# Patient Record
Sex: MALE | Employment: FULL TIME | URBAN - METROPOLITAN AREA
[De-identification: names, ages, dates, MRNs, and addresses within clinical notes are randomized per-mention and may not be internally consistent; named-entity substitution may affect disease eponyms.]

---

## 2017-10-02 ENCOUNTER — OFFICE VISIT (OUTPATIENT)
Dept: FAMILY MEDICINE CLINIC | Age: 47
End: 2017-10-02

## 2017-10-02 VITALS
WEIGHT: 186 LBS | DIASTOLIC BLOOD PRESSURE: 74 MMHG | BODY MASS INDEX: 29.19 KG/M2 | TEMPERATURE: 98.9 F | HEIGHT: 67 IN | SYSTOLIC BLOOD PRESSURE: 124 MMHG

## 2017-10-02 DIAGNOSIS — Z87.19 HISTORY OF DIVERTICULITIS OF COLON: ICD-10-CM

## 2017-10-02 DIAGNOSIS — J30.1 SEASONAL ALLERGIC RHINITIS DUE TO POLLEN: Primary | ICD-10-CM

## 2017-10-02 PROCEDURE — 99203 OFFICE O/P NEW LOW 30 MIN: CPT | Performed by: INTERNAL MEDICINE

## 2017-10-02 RX ORDER — VITAMIN B COMPLEX
1 CAPSULE ORAL DAILY
COMMUNITY
End: 2019-11-04

## 2017-10-02 RX ORDER — MONTELUKAST SODIUM 10 MG/1
10 TABLET ORAL DAILY
COMMUNITY
End: 2017-10-11 | Stop reason: SDUPTHER

## 2017-10-02 ASSESSMENT — PATIENT HEALTH QUESTIONNAIRE - PHQ9
2. FEELING DOWN, DEPRESSED OR HOPELESS: 0
1. LITTLE INTEREST OR PLEASURE IN DOING THINGS: 0
SUM OF ALL RESPONSES TO PHQ9 QUESTIONS 1 & 2: 0
SUM OF ALL RESPONSES TO PHQ QUESTIONS 1-9: 0

## 2017-10-02 ASSESSMENT — ENCOUNTER SYMPTOMS
CONSTIPATION: 0
ABDOMINAL PAIN: 0
NAUSEA: 0
WHEEZING: 0
SINUS PRESSURE: 0
VOMITING: 0
RHINORRHEA: 0
COUGH: 0
SHORTNESS OF BREATH: 0
APNEA: 0
DIARRHEA: 0
SORE THROAT: 0
BLOOD IN STOOL: 0

## 2017-10-11 ENCOUNTER — TELEPHONE (OUTPATIENT)
Dept: FAMILY MEDICINE CLINIC | Age: 47
End: 2017-10-11

## 2017-10-11 RX ORDER — MONTELUKAST SODIUM 10 MG/1
10 TABLET ORAL DAILY
Qty: 30 TABLET | Refills: 5 | Status: SHIPPED | OUTPATIENT
Start: 2017-10-11 | End: 2019-01-09

## 2017-10-11 RX ORDER — LORATADINE AND PSEUDOEPHEDRINE 10; 240 MG/1; MG/1
1 TABLET, EXTENDED RELEASE ORAL DAILY
Qty: 30 TABLET | Refills: 5 | Status: SHIPPED | OUTPATIENT
Start: 2017-10-11 | End: 2019-01-11 | Stop reason: SDUPTHER

## 2017-10-11 NOTE — TELEPHONE ENCOUNTER
Pt is needing new rx's for   montelukast (SINGULAIR) 10 MG tablet     Loratadine-Pseudoephedrine (CLARITIN-D 24 HOUR PO - pt stated that if you get a rx for this med then you are not harrassed on taking it so often. Pt usually get a 30 count       [facility] - [phone]    Pl advise.

## 2018-02-22 ENCOUNTER — OFFICE VISIT (OUTPATIENT)
Dept: FAMILY MEDICINE CLINIC | Age: 48
End: 2018-02-22

## 2018-02-22 VITALS
DIASTOLIC BLOOD PRESSURE: 80 MMHG | TEMPERATURE: 97.9 F | WEIGHT: 202.4 LBS | SYSTOLIC BLOOD PRESSURE: 118 MMHG | HEIGHT: 67 IN | BODY MASS INDEX: 31.77 KG/M2

## 2018-02-22 DIAGNOSIS — R30.0 DYSURIA: ICD-10-CM

## 2018-02-22 DIAGNOSIS — R10.9 RIGHT FLANK PAIN: Primary | ICD-10-CM

## 2018-02-22 DIAGNOSIS — R10.9 RIGHT FLANK PAIN: ICD-10-CM

## 2018-02-22 LAB
A/G RATIO: 1.7 (ref 1.1–2.2)
ALBUMIN SERPL-MCNC: 4.5 G/DL (ref 3.4–5)
ALP BLD-CCNC: 92 U/L (ref 40–129)
ALT SERPL-CCNC: 25 U/L (ref 10–40)
ANION GAP SERPL CALCULATED.3IONS-SCNC: 13 MMOL/L (ref 3–16)
AST SERPL-CCNC: 22 U/L (ref 15–37)
BACTERIA: ABNORMAL /HPF
BASOPHILS ABSOLUTE: 0.1 K/UL (ref 0–0.2)
BASOPHILS RELATIVE PERCENT: 0.7 %
BILIRUB SERPL-MCNC: <0.2 MG/DL (ref 0–1)
BILIRUBIN URINE: NEGATIVE
BLOOD, URINE: ABNORMAL
BUN BLDV-MCNC: 12 MG/DL (ref 7–20)
CALCIUM SERPL-MCNC: 9.5 MG/DL (ref 8.3–10.6)
CHLORIDE BLD-SCNC: 101 MMOL/L (ref 99–110)
CLARITY: ABNORMAL
CO2: 28 MMOL/L (ref 21–32)
COLOR: YELLOW
COMMENT UA: ABNORMAL
CREAT SERPL-MCNC: 0.8 MG/DL (ref 0.9–1.3)
EOSINOPHILS ABSOLUTE: 0.1 K/UL (ref 0–0.6)
EOSINOPHILS RELATIVE PERCENT: 0.9 %
EPITHELIAL CELLS, UA: 0 /HPF (ref 0–5)
GFR AFRICAN AMERICAN: >60
GFR NON-AFRICAN AMERICAN: >60
GLOBULIN: 2.6 G/DL
GLUCOSE BLD-MCNC: 64 MG/DL (ref 70–99)
GLUCOSE URINE: NEGATIVE MG/DL
HCT VFR BLD CALC: 41 % (ref 40.5–52.5)
HEMOGLOBIN: 13.8 G/DL (ref 13.5–17.5)
HYALINE CASTS: 1 /LPF (ref 0–8)
KETONES, URINE: NEGATIVE MG/DL
LEUKOCYTE ESTERASE, URINE: ABNORMAL
LYMPHOCYTES ABSOLUTE: 3.5 K/UL (ref 1–5.1)
LYMPHOCYTES RELATIVE PERCENT: 25.2 %
MCH RBC QN AUTO: 30.4 PG (ref 26–34)
MCHC RBC AUTO-ENTMCNC: 33.7 G/DL (ref 31–36)
MCV RBC AUTO: 90.1 FL (ref 80–100)
MICROSCOPIC EXAMINATION: YES
MONOCYTES ABSOLUTE: 1.1 K/UL (ref 0–1.3)
MONOCYTES RELATIVE PERCENT: 8.1 %
NEUTROPHILS ABSOLUTE: 9 K/UL (ref 1.7–7.7)
NEUTROPHILS RELATIVE PERCENT: 65.1 %
NITRITE, URINE: POSITIVE
PDW BLD-RTO: 13.6 % (ref 12.4–15.4)
PH UA: 6
PLATELET # BLD: 353 K/UL (ref 135–450)
PMV BLD AUTO: 7.5 FL (ref 5–10.5)
POTASSIUM SERPL-SCNC: 4.2 MMOL/L (ref 3.5–5.1)
PROTEIN UA: 30 MG/DL
RBC # BLD: 4.55 M/UL (ref 4.2–5.9)
RBC UA: 129 /HPF (ref 0–4)
SODIUM BLD-SCNC: 142 MMOL/L (ref 136–145)
SPECIFIC GRAVITY UA: 1.02
TOTAL PROTEIN: 7.1 G/DL (ref 6.4–8.2)
UROBILINOGEN, URINE: 0.2 E.U./DL
WBC # BLD: 13.8 K/UL (ref 4–11)
WBC UA: 449 /HPF (ref 0–5)

## 2018-02-22 PROCEDURE — 99213 OFFICE O/P EST LOW 20 MIN: CPT | Performed by: FAMILY MEDICINE

## 2018-02-22 RX ORDER — CIPROFLOXACIN 500 MG/1
500 TABLET, FILM COATED ORAL 2 TIMES DAILY
Qty: 20 TABLET | Refills: 0 | Status: SHIPPED | OUTPATIENT
Start: 2018-02-22 | End: 2018-03-04

## 2018-02-22 NOTE — PROGRESS NOTES
Subjective:      Patient ID: Evangelina Dorsey is a 52 y.o. male. Patient presents with:  Abdominal Pain: right side sharp, stabbing pain x last night, burning with urination x weeks, low grade fever x last night      2 weeks of urine odor and dysuria. Comes and goes. Felt warm and chills started friday the 16th. 100 degree on Saturday the 17th  This am the right abdomen pain started in the lateral abdomen sharp  No blood in urine  Hx of diverticulitis. Some decrease in size of stool. For 2 weeks. Going every day. No blood in stool  Back is some tender on the right lower back as well    No hx of kidney stone  He has had testing in [place]   3 years colon done in  [address]    He has had several episodes of diverticulitis. 2012 colon done dr Ana Walls the surgeon he was seeing in [place]  Colon done 2015 also in [place]    Flagyl causes side effects for him       height is 5' 7\" (1.702 m) and weight is 202 lb 6.4 oz (91.8 kg). His oral temperature is 97.9 °F (36.6 °C). His blood pressure is 118/80. He had ct last year at [place] of the abdomen per patient                  Review of Systems    Objective:   Physical Exam   Constitutional: He appears well-developed and well-nourished. No distress. Pulmonary/Chest: Effort normal and breath sounds normal. No respiratory distress. He has no wheezes. He has no rales. Abdominal: Soft. Normal appearance and bowel sounds are normal. He exhibits no distension, no abdominal bruit, no pulsatile midline mass and no mass. There is no hepatosplenomegaly. There is no tenderness. There is CVA tenderness. There is no rigidity and no guarding. He is tender to palpate on the right lateral abdomen  Very vague. No clear rebound. He did not know which is worse  Tender on the right CVA area       Neurological: He is alert. Skin: Skin is warm, dry and intact. He is not diaphoretic. No cyanosis. No pallor. Nails show no clubbing. Assessment:       1.

## 2018-02-22 NOTE — PATIENT INSTRUCTIONS
Fluids  Take the antibiotic  Get the testing done now  See if worse or go to the er  See dr Krystin Neely tomorrow

## 2018-02-23 ENCOUNTER — OFFICE VISIT (OUTPATIENT)
Dept: FAMILY MEDICINE CLINIC | Age: 48
End: 2018-02-23

## 2018-02-23 VITALS
WEIGHT: 204.6 LBS | SYSTOLIC BLOOD PRESSURE: 118 MMHG | TEMPERATURE: 98.6 F | HEIGHT: 67 IN | BODY MASS INDEX: 32.11 KG/M2 | DIASTOLIC BLOOD PRESSURE: 76 MMHG

## 2018-02-23 DIAGNOSIS — Z87.19 HISTORY OF DIVERTICULITIS OF COLON: Primary | ICD-10-CM

## 2018-02-23 DIAGNOSIS — N30.00 ACUTE CYSTITIS WITHOUT HEMATURIA: ICD-10-CM

## 2018-02-23 PROCEDURE — 99213 OFFICE O/P EST LOW 20 MIN: CPT | Performed by: INTERNAL MEDICINE

## 2018-02-23 RX ORDER — CLINDAMYCIN HYDROCHLORIDE 300 MG/1
300 CAPSULE ORAL 3 TIMES DAILY
Qty: 30 CAPSULE | Refills: 0 | Status: SHIPPED | OUTPATIENT
Start: 2018-02-23 | End: 2018-03-05

## 2018-02-23 ASSESSMENT — ENCOUNTER SYMPTOMS
BLOOD IN STOOL: 0
SHORTNESS OF BREATH: 0
DIARRHEA: 0
RHINORRHEA: 0
CONSTIPATION: 0
SINUS PAIN: 0
APNEA: 0
ABDOMINAL PAIN: 0
COUGH: 0

## 2018-02-24 LAB
ORGANISM: ABNORMAL
URINE CULTURE, ROUTINE: ABNORMAL
URINE CULTURE, ROUTINE: ABNORMAL

## 2018-04-02 ENCOUNTER — OFFICE VISIT (OUTPATIENT)
Dept: FAMILY MEDICINE CLINIC | Age: 48
End: 2018-04-02

## 2018-04-02 VITALS
BODY MASS INDEX: 31.36 KG/M2 | DIASTOLIC BLOOD PRESSURE: 76 MMHG | TEMPERATURE: 98.6 F | WEIGHT: 199.8 LBS | SYSTOLIC BLOOD PRESSURE: 120 MMHG | HEIGHT: 67 IN

## 2018-04-02 DIAGNOSIS — S05.01XA ABRASION OF RIGHT CORNEA, INITIAL ENCOUNTER: ICD-10-CM

## 2018-04-02 PROCEDURE — 99213 OFFICE O/P EST LOW 20 MIN: CPT | Performed by: INTERNAL MEDICINE

## 2018-04-02 RX ORDER — SULFACETAMIDE SODIUM 100 MG/ML
2 SOLUTION/ DROPS OPHTHALMIC 4 TIMES DAILY
Qty: 10 ML | Refills: 0 | Status: SHIPPED | OUTPATIENT
Start: 2018-04-02 | End: 2018-04-12

## 2018-04-02 ASSESSMENT — ENCOUNTER SYMPTOMS
ABDOMINAL PAIN: 0
DIARRHEA: 0
EYE PAIN: 1
CONSTIPATION: 0
BLOOD IN STOOL: 0

## 2019-01-09 ENCOUNTER — OFFICE VISIT (OUTPATIENT)
Dept: FAMILY MEDICINE CLINIC | Age: 49
End: 2019-01-09
Payer: COMMERCIAL

## 2019-01-09 VITALS
SYSTOLIC BLOOD PRESSURE: 124 MMHG | TEMPERATURE: 97.9 F | WEIGHT: 191.4 LBS | BODY MASS INDEX: 30.04 KG/M2 | HEIGHT: 67 IN | DIASTOLIC BLOOD PRESSURE: 74 MMHG

## 2019-01-09 DIAGNOSIS — Z13.220 SCREENING FOR LIPID DISORDERS: ICD-10-CM

## 2019-01-09 DIAGNOSIS — R53.82 CHRONIC FATIGUE: ICD-10-CM

## 2019-01-09 DIAGNOSIS — Z00.00 PHYSICAL EXAM: Primary | ICD-10-CM

## 2019-01-09 DIAGNOSIS — G44.89 OTHER HEADACHE SYNDROME: ICD-10-CM

## 2019-01-09 DIAGNOSIS — Z13.1 SCREENING FOR DIABETES MELLITUS: ICD-10-CM

## 2019-01-09 LAB
BASOPHILS ABSOLUTE: 0.1 K/UL (ref 0–0.2)
BASOPHILS RELATIVE PERCENT: 0.6 %
CHOLESTEROL, TOTAL: 206 MG/DL (ref 0–199)
EOSINOPHILS ABSOLUTE: 0.2 K/UL (ref 0–0.6)
EOSINOPHILS RELATIVE PERCENT: 2.4 %
GLUCOSE BLD-MCNC: 78 MG/DL (ref 70–99)
HCT VFR BLD CALC: 45.2 % (ref 40.5–52.5)
HDLC SERPL-MCNC: 38 MG/DL (ref 40–60)
HEMOGLOBIN: 15.2 G/DL (ref 13.5–17.5)
LDL CHOLESTEROL CALCULATED: 150 MG/DL
LYMPHOCYTES ABSOLUTE: 2 K/UL (ref 1–5.1)
LYMPHOCYTES RELATIVE PERCENT: 24.7 %
MCH RBC QN AUTO: 30.5 PG (ref 26–34)
MCHC RBC AUTO-ENTMCNC: 33.5 G/DL (ref 31–36)
MCV RBC AUTO: 91.2 FL (ref 80–100)
MONOCYTES ABSOLUTE: 0.9 K/UL (ref 0–1.3)
MONOCYTES RELATIVE PERCENT: 10.9 %
NEUTROPHILS ABSOLUTE: 5 K/UL (ref 1.7–7.7)
NEUTROPHILS RELATIVE PERCENT: 61.4 %
PDW BLD-RTO: 13.6 % (ref 12.4–15.4)
PLATELET # BLD: 302 K/UL (ref 135–450)
PMV BLD AUTO: 7.6 FL (ref 5–10.5)
RBC # BLD: 4.96 M/UL (ref 4.2–5.9)
TRIGL SERPL-MCNC: 88 MG/DL (ref 0–150)
TSH SERPL DL<=0.05 MIU/L-ACNC: 1.88 UIU/ML (ref 0.27–4.2)
VLDLC SERPL CALC-MCNC: 18 MG/DL
WBC # BLD: 8.1 K/UL (ref 4–11)

## 2019-01-09 PROCEDURE — 99396 PREV VISIT EST AGE 40-64: CPT | Performed by: INTERNAL MEDICINE

## 2019-01-09 PROCEDURE — 99213 OFFICE O/P EST LOW 20 MIN: CPT | Performed by: INTERNAL MEDICINE

## 2019-01-09 ASSESSMENT — PATIENT HEALTH QUESTIONNAIRE - PHQ9
SUM OF ALL RESPONSES TO PHQ QUESTIONS 1-9: 0
SUM OF ALL RESPONSES TO PHQ9 QUESTIONS 1 & 2: 0
2. FEELING DOWN, DEPRESSED OR HOPELESS: 0
1. LITTLE INTEREST OR PLEASURE IN DOING THINGS: 0
SUM OF ALL RESPONSES TO PHQ QUESTIONS 1-9: 0

## 2019-01-09 ASSESSMENT — ENCOUNTER SYMPTOMS
RHINORRHEA: 0
VOMITING: 0
APNEA: 0
SINUS PRESSURE: 0
ABDOMINAL PAIN: 0
DIARRHEA: 0
BLOOD IN STOOL: 0
SHORTNESS OF BREATH: 0
COUGH: 0
SORE THROAT: 0
CONSTIPATION: 0
WHEEZING: 0
NAUSEA: 0

## 2019-01-11 RX ORDER — LORATADINE AND PSEUDOEPHEDRINE 10; 240 MG/1; MG/1
1 TABLET, EXTENDED RELEASE ORAL DAILY
Qty: 30 TABLET | Refills: 5 | Status: SHIPPED | OUTPATIENT
Start: 2019-01-11 | End: 2019-11-25 | Stop reason: SDUPTHER

## 2019-02-08 PROBLEM — Z00.00 PHYSICAL EXAM: Status: RESOLVED | Noted: 2019-01-09 | Resolved: 2019-02-08

## 2019-11-04 ENCOUNTER — OFFICE VISIT (OUTPATIENT)
Dept: FAMILY MEDICINE CLINIC | Age: 49
End: 2019-11-04
Payer: COMMERCIAL

## 2019-11-04 VITALS
DIASTOLIC BLOOD PRESSURE: 70 MMHG | HEIGHT: 67 IN | WEIGHT: 199 LBS | TEMPERATURE: 98 F | BODY MASS INDEX: 31.23 KG/M2 | SYSTOLIC BLOOD PRESSURE: 116 MMHG

## 2019-11-04 DIAGNOSIS — R10.12 LEFT UPPER QUADRANT PAIN: ICD-10-CM

## 2019-11-04 DIAGNOSIS — R00.0 TACHYCARDIA: ICD-10-CM

## 2019-11-04 DIAGNOSIS — R53.82 CHRONIC FATIGUE: ICD-10-CM

## 2019-11-04 DIAGNOSIS — R53.82 CHRONIC FATIGUE: Primary | ICD-10-CM

## 2019-11-04 DIAGNOSIS — R51.9 NONINTRACTABLE EPISODIC HEADACHE, UNSPECIFIED HEADACHE TYPE: ICD-10-CM

## 2019-11-04 LAB
ALBUMIN SERPL-MCNC: 4.9 G/DL (ref 3.4–5)
ALP BLD-CCNC: 91 U/L (ref 40–129)
ALT SERPL-CCNC: 27 U/L (ref 10–40)
ANION GAP SERPL CALCULATED.3IONS-SCNC: 18 MMOL/L (ref 3–16)
AST SERPL-CCNC: 26 U/L (ref 15–37)
BASOPHILS ABSOLUTE: 0.1 K/UL (ref 0–0.2)
BASOPHILS RELATIVE PERCENT: 0.6 %
BILIRUB SERPL-MCNC: <0.2 MG/DL (ref 0–1)
BILIRUBIN DIRECT: <0.2 MG/DL (ref 0–0.3)
BILIRUBIN, INDIRECT: NORMAL MG/DL (ref 0–1)
BUN BLDV-MCNC: 10 MG/DL (ref 7–20)
CALCIUM SERPL-MCNC: 9.6 MG/DL (ref 8.3–10.6)
CHLORIDE BLD-SCNC: 100 MMOL/L (ref 99–110)
CO2: 24 MMOL/L (ref 21–32)
CREAT SERPL-MCNC: 0.9 MG/DL (ref 0.9–1.3)
EOSINOPHILS ABSOLUTE: 0.1 K/UL (ref 0–0.6)
EOSINOPHILS RELATIVE PERCENT: 0.9 %
GFR AFRICAN AMERICAN: >60
GFR NON-AFRICAN AMERICAN: >60
GLUCOSE BLD-MCNC: 84 MG/DL (ref 70–99)
HCT VFR BLD CALC: 42.8 % (ref 40.5–52.5)
HEMOGLOBIN: 14.4 G/DL (ref 13.5–17.5)
LYMPHOCYTES ABSOLUTE: 3.1 K/UL (ref 1–5.1)
LYMPHOCYTES RELATIVE PERCENT: 32.3 %
MCH RBC QN AUTO: 30.8 PG (ref 26–34)
MCHC RBC AUTO-ENTMCNC: 33.7 G/DL (ref 31–36)
MCV RBC AUTO: 91.4 FL (ref 80–100)
MONOCYTES ABSOLUTE: 0.6 K/UL (ref 0–1.3)
MONOCYTES RELATIVE PERCENT: 6 %
NEUTROPHILS ABSOLUTE: 5.9 K/UL (ref 1.7–7.7)
NEUTROPHILS RELATIVE PERCENT: 60.2 %
PDW BLD-RTO: 13.7 % (ref 12.4–15.4)
PLATELET # BLD: 383 K/UL (ref 135–450)
PMV BLD AUTO: 7.6 FL (ref 5–10.5)
POTASSIUM SERPL-SCNC: 4.1 MMOL/L (ref 3.5–5.1)
RBC # BLD: 4.68 M/UL (ref 4.2–5.9)
SODIUM BLD-SCNC: 142 MMOL/L (ref 136–145)
TOTAL PROTEIN: 7.4 G/DL (ref 6.4–8.2)
TSH SERPL DL<=0.05 MIU/L-ACNC: 1.85 UIU/ML (ref 0.27–4.2)
WBC # BLD: 9.7 K/UL (ref 4–11)

## 2019-11-04 PROCEDURE — 99214 OFFICE O/P EST MOD 30 MIN: CPT | Performed by: INTERNAL MEDICINE

## 2019-11-04 PROCEDURE — 93000 ELECTROCARDIOGRAM COMPLETE: CPT | Performed by: INTERNAL MEDICINE

## 2019-11-04 ASSESSMENT — ENCOUNTER SYMPTOMS
SINUS PAIN: 0
RHINORRHEA: 0
APNEA: 0
SHORTNESS OF BREATH: 0
SINUS PRESSURE: 0
SORE THROAT: 0
COUGH: 0

## 2019-11-06 ENCOUNTER — TELEPHONE (OUTPATIENT)
Dept: FAMILY MEDICINE CLINIC | Age: 49
End: 2019-11-06

## 2019-11-25 RX ORDER — LORATADINE AND PSEUDOEPHEDRINE 10; 240 MG/1; MG/1
1 TABLET, EXTENDED RELEASE ORAL DAILY
Qty: 30 TABLET | Refills: 5 | Status: SHIPPED | OUTPATIENT
Start: 2019-11-25 | End: 2020-12-18 | Stop reason: SDUPTHER

## 2020-12-18 RX ORDER — LORATADINE AND PSEUDOEPHEDRINE 10; 240 MG/1; MG/1
1 TABLET, EXTENDED RELEASE ORAL DAILY
Qty: 30 TABLET | Refills: 5 | Status: SHIPPED | OUTPATIENT
Start: 2020-12-18 | End: 2021-08-20

## 2021-08-20 RX ORDER — LORATADINE AND PSEUDOEPHEDRINE 10; 240 MG/1; MG/1
1 TABLET, EXTENDED RELEASE ORAL DAILY
Qty: 30 TABLET | Refills: 5 | Status: SHIPPED | OUTPATIENT
Start: 2021-08-20 | End: 2022-06-14

## 2021-11-24 ENCOUNTER — OFFICE VISIT (OUTPATIENT)
Dept: FAMILY MEDICINE CLINIC | Age: 51
End: 2021-11-24
Payer: COMMERCIAL

## 2021-11-24 VITALS
BODY MASS INDEX: 32.62 KG/M2 | HEIGHT: 67 IN | SYSTOLIC BLOOD PRESSURE: 120 MMHG | DIASTOLIC BLOOD PRESSURE: 84 MMHG | WEIGHT: 207.8 LBS | TEMPERATURE: 97.3 F

## 2021-11-24 DIAGNOSIS — M54.50 CHRONIC BILATERAL LOW BACK PAIN WITHOUT SCIATICA: Primary | ICD-10-CM

## 2021-11-24 DIAGNOSIS — G89.29 CHRONIC BILATERAL LOW BACK PAIN WITHOUT SCIATICA: Primary | ICD-10-CM

## 2021-11-24 PROCEDURE — 99213 OFFICE O/P EST LOW 20 MIN: CPT | Performed by: INTERNAL MEDICINE

## 2021-11-24 RX ORDER — METHYLPREDNISOLONE 4 MG/1
TABLET ORAL
Qty: 1 KIT | Refills: 0 | Status: SHIPPED | OUTPATIENT
Start: 2021-11-24 | End: 2022-04-27

## 2021-11-24 SDOH — ECONOMIC STABILITY: FOOD INSECURITY: WITHIN THE PAST 12 MONTHS, YOU WORRIED THAT YOUR FOOD WOULD RUN OUT BEFORE YOU GOT MONEY TO BUY MORE.: NEVER TRUE

## 2021-11-24 SDOH — ECONOMIC STABILITY: FOOD INSECURITY: WITHIN THE PAST 12 MONTHS, THE FOOD YOU BOUGHT JUST DIDN'T LAST AND YOU DIDN'T HAVE MONEY TO GET MORE.: NEVER TRUE

## 2021-11-24 ASSESSMENT — PATIENT HEALTH QUESTIONNAIRE - PHQ9
SUM OF ALL RESPONSES TO PHQ9 QUESTIONS 1 & 2: 0
SUM OF ALL RESPONSES TO PHQ QUESTIONS 1-9: 0
SUM OF ALL RESPONSES TO PHQ QUESTIONS 1-9: 0
2. FEELING DOWN, DEPRESSED OR HOPELESS: 0
SUM OF ALL RESPONSES TO PHQ QUESTIONS 1-9: 0
1. LITTLE INTEREST OR PLEASURE IN DOING THINGS: 0

## 2021-11-24 ASSESSMENT — ENCOUNTER SYMPTOMS
SINUS PAIN: 0
BACK PAIN: 1
RHINORRHEA: 0
COUGH: 0
APNEA: 0
ABDOMINAL PAIN: 0

## 2021-11-24 ASSESSMENT — SOCIAL DETERMINANTS OF HEALTH (SDOH): HOW HARD IS IT FOR YOU TO PAY FOR THE VERY BASICS LIKE FOOD, HOUSING, MEDICAL CARE, AND HEATING?: NOT HARD AT ALL

## 2021-11-24 NOTE — PROGRESS NOTES
Deepali Michaels (:  1970) is a 46 y.o. male,Established patient, here for evaluation of the following chief complaint(s):  Lower Back Pain (patient c/o lower back pain- worse x 8 months. patient has seen a Chiropractor )         ASSESSMENT/PLAN:   Diagnosis Orders   1. Chronic bilateral low back pain without sciatica       Outpatient Encounter Medications as of 2021   Medication Sig Dispense Refill    methylPREDNISolone (MEDROL, ALBERTO,) 4 MG tablet Take by mouth. 1 kit 0    loratadine-pseudoephedrine (CLARITIN-D 24HR)  MG per extended release tablet TAKE 1 TABLET BY MOUTH DAILY 30 tablet 5    linaclotide (LINZESS) 145 MCG capsule Take 145 mcg by mouth daily as needed (Patient not taking: Reported on 2021)       No facility-administered encounter medications on file as of 2021. No orders of the defined types were placed in this encounter. refer for PT         Subjective   SUBJECTIVE/OBJECTIVE:  HPI  Chief Complaint   Patient presents with    Lower Back Pain     patient c/o lower back pain- worse x 8 months. patient has seen a Chiropractor      Deepali Michaels is a 46 y.o. male with the [facility]:  Patient Active Problem List    Diagnosis Date Noted    Chronic fatigue 2019    Other headache syndrome 2019    Abrasion of right cornea 2018    Acute cystitis without hematuria 2018    History of diverticulitis of colon 10/02/2017    Seasonal allergic rhinitis due to pollen 10/02/2017     Current Outpatient Medications   Medication Sig Dispense Refill    loratadine-pseudoephedrine (CLARITIN-D 24HR)  MG per extended release tablet TAKE 1 TABLET BY MOUTH DAILY 30 tablet 5    linaclotide (LINZESS) 145 MCG capsule Take 145 mcg by mouth daily as needed (Patient not taking: Reported on 2021)       No current facility-administered medications for this visit.      Allergies: Flagyl [metronidazole]  Past Medical History: Diagnosis Date    Allergic rhinitis      Past Surgical History:   Procedure Laterality Date    APPENDECTOMY      EYE SURGERY Left     lazy eye    OTHER SURGICAL HISTORY      mass removed from right side    TONSILLECTOMY       Family History   Problem Relation Age of Onset    Breast Cancer Mother     Colon Cancer Father     Colon Cancer Maternal Grandmother     Cancer Maternal Grandfather      Social History     Tobacco Use    Smoking status: Never Smoker    Smokeless tobacco: Never Used   Substance Use Topics    Alcohol use: No     Review of Systems   Constitutional: Negative for chills and diaphoresis. HENT: Negative for congestion, postnasal drip, rhinorrhea and sinus pain. Eyes: Negative for visual disturbance. Respiratory: Negative for apnea and cough. Gastrointestinal: Negative for abdominal pain. Musculoskeletal: Positive for back pain. Patient presents with:  Lower Back Pain: patient c/o lower back pain- worse x 8 months. patient denies injury and has seen a Chiropractor             Objective   Physical Exam  Vitals and nursing note reviewed. Constitutional:       Appearance: Normal appearance. HENT:      Head: Normocephalic and atraumatic. Cardiovascular:      Rate and Rhythm: Normal rate. Pulmonary:      Effort: No respiratory distress. Abdominal:      General: There is no distension. Musculoskeletal:      Comments: Positive paravertebral muscle tenderness L4-L5 with palpable spasm . Neurological:      Mental Status: He is alert.                   An electronic signature was used to authenticate this note.    --Oliver Jaramillo DO

## 2022-04-27 ENCOUNTER — OFFICE VISIT (OUTPATIENT)
Dept: FAMILY MEDICINE CLINIC | Age: 52
End: 2022-04-27
Payer: COMMERCIAL

## 2022-04-27 VITALS — TEMPERATURE: 97.3 F | WEIGHT: 200 LBS | HEIGHT: 67 IN | BODY MASS INDEX: 31.39 KG/M2

## 2022-04-27 DIAGNOSIS — R21 SKIN RASH: Primary | ICD-10-CM

## 2022-04-27 PROCEDURE — 99213 OFFICE O/P EST LOW 20 MIN: CPT | Performed by: INTERNAL MEDICINE

## 2022-04-27 RX ORDER — METHYLPREDNISOLONE 4 MG/1
TABLET ORAL
Qty: 1 KIT | Refills: 0 | Status: SHIPPED | OUTPATIENT
Start: 2022-04-27

## 2022-04-27 ASSESSMENT — ENCOUNTER SYMPTOMS
SINUS PRESSURE: 0
SORE THROAT: 0
SINUS PAIN: 0
ABDOMINAL PAIN: 0
RHINORRHEA: 0
SHORTNESS OF BREATH: 0
COUGH: 0

## 2022-04-27 NOTE — PATIENT INSTRUCTIONS
Thank you for choosing [facility]. Please bring a current list of medications to every appointment. Please contact your pharmacy for any prescription refill(s) that you are requesting.

## 2022-04-27 NOTE — PROGRESS NOTES
Ector Childs (:  1970) is a 46 y.o. male,Established patient, here for evaluation of the following chief complaint(s):  Rash (patient c/o rash- arms, legs, chest x 2 weeks)         ASSESSMENT/PLAN:   Diagnosis Orders   1. Skin rash       Outpatient Encounter Medications as of 2022   Medication Sig Dispense Refill    methylPREDNISolone (MEDROL, ALBERTO,) 4 MG tablet Take by mouth. 1 kit 0    loratadine-pseudoephedrine (CLARITIN-D 24HR)  MG per extended release tablet TAKE 1 TABLET BY MOUTH DAILY 30 tablet 5    [DISCONTINUED] methylPREDNISolone (MEDROL, ALBERTO,) 4 MG tablet Take by mouth. 1 kit 0    linaclotide (LINZESS) 145 MCG capsule Take 145 mcg by mouth daily as needed (Patient not taking: Reported on 2021)       No facility-administered encounter medications on file as of 2022. No orders of the defined types were placed in this encounter. Subjective   SUBJECTIVE/OBJECTIVE:  HPI   Chief Complaint   Patient presents with    Rash     patient c/o rash- arms, legs, chest x 2 weeks     Ector Childs is a 46 y.o. male with the following history as recorded in [facility]:  Patient Active Problem List    Diagnosis Date Noted    Chronic fatigue 2019    Other headache syndrome 2019    Abrasion of right cornea 2018    Acute cystitis without hematuria 2018    History of diverticulitis of colon 10/02/2017    Seasonal allergic rhinitis due to pollen 10/02/2017     Current Outpatient Medications   Medication Sig Dispense Refill    loratadine-pseudoephedrine (CLARITIN-D 24HR)  MG per extended release tablet TAKE 1 TABLET BY MOUTH DAILY 30 tablet 5    linaclotide (LINZESS) 145 MCG capsule Take 145 mcg by mouth daily as needed (Patient not taking: Reported on 2021)       No current facility-administered medications for this visit.      Allergies: Flagyl [metronidazole]  Past Medical History:   Diagnosis Date    Allergic rhinitis      Past Surgical History:   Procedure Laterality Date    APPENDECTOMY      EYE SURGERY Left     lazy eye    OTHER SURGICAL HISTORY      mass removed from right side    TONSILLECTOMY       Family History   Problem Relation Age of Onset    Breast Cancer Mother     Colon Cancer Father     Colon Cancer Maternal Grandmother     Cancer Maternal Grandfather      Social History     Tobacco Use    Smoking status: Never Smoker    Smokeless tobacco: Never Used   Substance Use Topics    Alcohol use: No       Review of Systems   Constitutional: Negative for chills, diaphoresis and fatigue. HENT: Negative for congestion, postnasal drip, rhinorrhea, sinus pressure, sinus pain and sore throat. Eyes: Negative for visual disturbance. Respiratory: Negative for cough and shortness of breath. Cardiovascular: Negative for chest pain and palpitations. Gastrointestinal: Negative for abdominal pain. Skin:        Patient presents with:  Rash: patient c/o rash- arms, legs, chest x 2 weeks            Objective   Physical Exam  Vitals reviewed. Constitutional:       Appearance: Normal appearance. HENT:      Head: Normocephalic and atraumatic. Cardiovascular:      Rate and Rhythm: Normal rate and regular rhythm. Pulmonary:      Effort: No respiratory distress. Breath sounds: No wheezing. Abdominal:      General: There is no distension. Tenderness: There is no abdominal tenderness. Skin:     Comments: Rash arms legs abd . Neurological:      Mental Status: He is alert.                   An electronic signature was used to authenticate this note.    --Nancy Boland, DO

## 2022-06-14 RX ORDER — LORATADINE/PSEUDOEPHEDRINE 10MG-240MG
TABLET, EXTENDED RELEASE 24 HR ORAL
Qty: 30 TABLET | Refills: 5 | Status: SHIPPED | OUTPATIENT
Start: 2022-06-14

## 2022-09-13 ENCOUNTER — TELEPHONE (OUTPATIENT)
Dept: FAMILY MEDICINE CLINIC | Age: 52
End: 2022-09-13

## 2022-09-13 NOTE — TELEPHONE ENCOUNTER
----- Message from Julian Martinez sent at 9/13/2022 10:50 AM EDT -----  Subject: Message to Provider    QUESTIONS  Information for Provider? VIDAL MASCORRO IS CALLING IN ABOUT THE SKIN RASH   THAT PT HAS BEEN SEEN FOR. PT WOULD LIKE TO HAVE A TEST DONE FOR HEAVY   METALS POISON BLOOD TEST. PT DID GO SEE A DERMATOLOGIST WITH NO HELP. PT   WOULD LIKE A CALL BACK.   ---------------------------------------------------------------------------  --------------  Taryn SNIDER  [phone]; OK to leave message on voicemail  ---------------------------------------------------------------------------  --------------  SCRIPT ANSWERS  Relationship to Patient? Other  Representative Name? LUDWIN  Is the Representative on the appropriate HIPAA document in Epic?  Yes

## 2023-03-24 RX ORDER — LORATADINE/PSEUDOEPHEDRINE 10MG-240MG
TABLET, EXTENDED RELEASE 24 HR ORAL
Qty: 30 TABLET | Refills: 5 | Status: SHIPPED | OUTPATIENT
Start: 2023-03-24